# Patient Record
Sex: FEMALE | Race: WHITE
[De-identification: names, ages, dates, MRNs, and addresses within clinical notes are randomized per-mention and may not be internally consistent; named-entity substitution may affect disease eponyms.]

---

## 2020-01-03 ENCOUNTER — HOSPITAL ENCOUNTER (OUTPATIENT)
Dept: HOSPITAL 53 - M WHC | Age: 25
End: 2020-01-03
Attending: ADVANCED PRACTICE MIDWIFE
Payer: COMMERCIAL

## 2020-01-03 DIAGNOSIS — Z34.02: Primary | ICD-10-CM

## 2020-01-28 ENCOUNTER — HOSPITAL ENCOUNTER (OUTPATIENT)
Dept: HOSPITAL 53 - M WHC | Age: 25
End: 2020-01-28
Attending: ADVANCED PRACTICE MIDWIFE
Payer: COMMERCIAL

## 2020-01-28 ENCOUNTER — HOSPITAL ENCOUNTER (OUTPATIENT)
Dept: HOSPITAL 53 - M PLALAB | Age: 25
End: 2020-01-28
Attending: ADVANCED PRACTICE MIDWIFE
Payer: COMMERCIAL

## 2020-01-28 DIAGNOSIS — Z13.79: Primary | ICD-10-CM

## 2020-01-28 DIAGNOSIS — Z34.02: Primary | ICD-10-CM

## 2020-01-29 NOTE — REP
Clinical:  Anatomical evaluation.

 

Comparison: None .

 

Findings:

Examination demonstrates a single live intrauterine pregnancy in cephalic

presentation.  Fetal motion is identified by technologist.  Placenta is noted

posterior and grade I without evidence for placenta previa or abruption.

Amniotic fluid volume is normal.  Cervix measures 4.2 cm in length and appears

closed.  No evidence for nuchal cord.

 

Gestational age by current measurements 18 weeks 6 days with KRISHNA 06/24/2020 .

 

FHR equals 158 beats per minute.

BPD  4.4 cm     19 weeks 2 days

HC  16.1 cm     18 weeks 6 days

AC  12.8 cm     18 weeks 3 days

FL  3.0 cm      19 weeks 2 days

HL  2.8 cm      19 weeks 0 days

HC/AC ratio  1.26

 

Estimated fetal weight 259 grams ( 46 percentile).

 

Anatomical assessment demonstrates normal structures including cranium, choroid

plexus, cavum, cerebellum/posterior fossa, diaphragm, stomach, cord

insertion/three-vessel cord, kidneys/bladder, spine, and extremities.

 

Impression:

Single live intrauterine pregnancy in cephalic presentation demonstrating

appropriate estimated fetal weight.

2.  Limited evaluation of the facial features and heart/ventricular outflow

tract.  Remainder of the anatomical assessment is complete and normal.

## 2020-02-25 ENCOUNTER — HOSPITAL ENCOUNTER (OUTPATIENT)
Dept: HOSPITAL 53 - M WHC | Age: 25
End: 2020-02-25
Attending: OBSTETRICS & GYNECOLOGY
Payer: COMMERCIAL

## 2020-02-25 DIAGNOSIS — Z3A.22: ICD-10-CM

## 2020-02-25 DIAGNOSIS — Z34.82: Primary | ICD-10-CM

## 2020-02-25 NOTE — REP
Obstetric ultrasound for fetal anatomy follow-up:

On the prior study of 01/28 2020 suboptimally demonstrated were the fetal facial

profile, four-chamber view of the heart and cardiac right and left ventricular

outflow tracts.  The remainder of the fetal anatomy was adequately demonstrated

and unremarkable.

 

On the study today the fetal facial profile, four-chamber view of the heart and

cardiac right and left ventricular outflow tracts are adequately demonstrated and

are unremarkable.

 

The remainder of the fetal anatomy was unremarkable previously.

There are no fetal anomalies.

 

There is a single intrauterine gestation in a breech presentation.

There is fetal movement and cardiac activity.

The fetal heart rate is 161 beats per minute.

The placenta is posterior pleura.  There is no previa.  The placenta is grade 1.

The amniotic fluid volume subjectively normal.

The cervix measures 4.3 cm length.

Fetal heart rate is 161 beats per minute.

 

Gestational age by today's ultrasound is 22 weeks 3 days.

Gestational age by the first ultrasound is 22 weeks 6 days.

The KRISHNA based on gestational age of 0.2 weeks 6 days is a 06/24/2020.

 

Fetal weight is 552 grams/1 pound, 3 ounces.

This is the 49th percentile for 22-week 6 days.

 

 

Electronically Signed by

Jhonny Carrillo MD 02/25/2020 11:48 A

## 2020-03-19 ENCOUNTER — HOSPITAL ENCOUNTER (OUTPATIENT)
Dept: HOSPITAL 53 - M PLALAB | Age: 25
End: 2020-03-19
Attending: OBSTETRICS & GYNECOLOGY
Payer: COMMERCIAL

## 2020-03-19 DIAGNOSIS — Z34.02: Primary | ICD-10-CM

## 2020-03-19 LAB
HCT VFR BLD AUTO: 37.5 % (ref 36–47)
HGB BLD-MCNC: 12.4 G/DL (ref 12–15.5)
MCH RBC QN AUTO: 31.2 PG (ref 27–33)
MCHC RBC AUTO-ENTMCNC: 33.1 G/DL (ref 32–36.5)
MCV RBC AUTO: 94.5 FL (ref 80–96)
PLATELET # BLD AUTO: 298 10^3/UL (ref 150–450)
RBC # BLD AUTO: 3.97 10^6/UL (ref 4–5.4)
WBC # BLD AUTO: 13.4 10^3/UL (ref 4–10)

## 2020-05-26 ENCOUNTER — HOSPITAL ENCOUNTER (OUTPATIENT)
Dept: HOSPITAL 53 - M SFHCWAGY | Age: 25
End: 2020-05-26
Attending: OBSTETRICS & GYNECOLOGY
Payer: COMMERCIAL

## 2020-05-26 DIAGNOSIS — Z34.03: Primary | ICD-10-CM

## 2020-05-27 ENCOUNTER — HOSPITAL ENCOUNTER (OUTPATIENT)
Dept: HOSPITAL 53 - M PLALAB | Age: 25
End: 2020-05-27
Attending: OBSTETRICS & GYNECOLOGY
Payer: COMMERCIAL

## 2020-05-27 DIAGNOSIS — Z34.03: Primary | ICD-10-CM

## 2020-05-27 LAB
ALT SERPL W P-5'-P-CCNC: 16 U/L (ref 12–78)
BILIRUB SERPL-MCNC: 0.3 MG/DL (ref 0.2–1)
CREAT SERPL-MCNC: 0.7 MG/DL (ref 0.55–1.3)
CREAT UR-MCNC: 40.9 MG/DL
GFR SERPL CREATININE-BSD FRML MDRD: > 60 ML/MIN/{1.73_M2} (ref 60–?)
HCT VFR BLD AUTO: 37.2 % (ref 36–47)
HGB BLD-MCNC: 12.3 G/DL (ref 12–15.5)
LDH SERPL L TO P-CCNC: 178 U/L (ref 84–246)
MCH RBC QN AUTO: 29.9 PG (ref 27–33)
MCHC RBC AUTO-ENTMCNC: 33.1 G/DL (ref 32–36.5)
MCV RBC AUTO: 90.5 FL (ref 80–96)
PLATELET # BLD AUTO: 272 10^3/UL (ref 150–450)
PROT UR-MCNC: 11.8 MG/DL (ref 0–12)
RBC # BLD AUTO: 4.11 10^6/UL (ref 4–5.4)
URATE SERPL-MCNC: 4.8 MG/DL (ref 2.6–6)
WBC # BLD AUTO: 10 10^3/UL (ref 4–10)

## 2020-06-13 ENCOUNTER — HOSPITAL ENCOUNTER (OUTPATIENT)
Dept: HOSPITAL 53 - M LABSMTC | Age: 25
End: 2020-06-13
Attending: ANESTHESIOLOGY
Payer: COMMERCIAL

## 2020-06-13 DIAGNOSIS — Z11.59: ICD-10-CM

## 2020-06-13 DIAGNOSIS — Z03.818: Primary | ICD-10-CM

## 2020-06-16 ENCOUNTER — HOSPITAL ENCOUNTER (INPATIENT)
Dept: HOSPITAL 53 - M LDI | Age: 25
LOS: 2 days | Discharge: HOME | DRG: 773 | End: 2020-06-18
Attending: OBSTETRICS & GYNECOLOGY | Admitting: OBSTETRICS & GYNECOLOGY
Payer: COMMERCIAL

## 2020-06-16 VITALS — DIASTOLIC BLOOD PRESSURE: 64 MMHG | SYSTOLIC BLOOD PRESSURE: 119 MMHG

## 2020-06-16 VITALS — WEIGHT: 219.8 LBS | HEIGHT: 63 IN | BODY MASS INDEX: 38.95 KG/M2

## 2020-06-16 VITALS — SYSTOLIC BLOOD PRESSURE: 113 MMHG | DIASTOLIC BLOOD PRESSURE: 60 MMHG

## 2020-06-16 VITALS — DIASTOLIC BLOOD PRESSURE: 61 MMHG | SYSTOLIC BLOOD PRESSURE: 106 MMHG

## 2020-06-16 VITALS — SYSTOLIC BLOOD PRESSURE: 124 MMHG | DIASTOLIC BLOOD PRESSURE: 71 MMHG

## 2020-06-16 VITALS — DIASTOLIC BLOOD PRESSURE: 57 MMHG | SYSTOLIC BLOOD PRESSURE: 108 MMHG

## 2020-06-16 VITALS — DIASTOLIC BLOOD PRESSURE: 69 MMHG | SYSTOLIC BLOOD PRESSURE: 121 MMHG

## 2020-06-16 VITALS — SYSTOLIC BLOOD PRESSURE: 110 MMHG | DIASTOLIC BLOOD PRESSURE: 58 MMHG

## 2020-06-16 VITALS — SYSTOLIC BLOOD PRESSURE: 120 MMHG | DIASTOLIC BLOOD PRESSURE: 77 MMHG

## 2020-06-16 VITALS — SYSTOLIC BLOOD PRESSURE: 121 MMHG | DIASTOLIC BLOOD PRESSURE: 80 MMHG

## 2020-06-16 DIAGNOSIS — Z3A.39: ICD-10-CM

## 2020-06-16 DIAGNOSIS — O26.893: Primary | ICD-10-CM

## 2020-06-16 DIAGNOSIS — M51.26: ICD-10-CM

## 2020-06-16 LAB
HCT VFR BLD AUTO: 37.2 % (ref 36–47)
HGB BLD-MCNC: 12.6 G/DL (ref 12–15.5)
MCH RBC QN AUTO: 30.3 PG (ref 27–33)
MCHC RBC AUTO-ENTMCNC: 33.9 G/DL (ref 32–36.5)
MCV RBC AUTO: 89.4 FL (ref 80–96)
PLATELET # BLD AUTO: 280 10^3/UL (ref 150–450)
RBC # BLD AUTO: 4.16 10^6/UL (ref 4–5.4)
WBC # BLD AUTO: 13 10^3/UL (ref 4–10)

## 2020-06-16 RX ADMIN — SODIUM CHLORIDE, POTASSIUM CHLORIDE, SODIUM LACTATE AND CALCIUM CHLORIDE SCH MLS/HR: 600; 310; 30; 20 INJECTION, SOLUTION INTRAVENOUS at 16:10

## 2020-06-16 RX ADMIN — KETOROLAC TROMETHAMINE SCH MG: 30 INJECTION, SOLUTION INTRAMUSCULAR at 14:29

## 2020-06-16 RX ADMIN — Medication SCH TAB: at 12:59

## 2020-06-16 RX ADMIN — DOCUSATE SODIUM SCH MG: 100 CAPSULE, LIQUID FILLED ORAL at 12:59

## 2020-06-16 RX ADMIN — SODIUM CHLORIDE, POTASSIUM CHLORIDE, SODIUM LACTATE AND CALCIUM CHLORIDE SCH MLS/HR: 600; 310; 30; 20 INJECTION, SOLUTION INTRAVENOUS at 12:59

## 2020-06-16 RX ADMIN — DOCUSATE SODIUM SCH MG: 100 CAPSULE, LIQUID FILLED ORAL at 21:18

## 2020-06-16 RX ADMIN — KETOROLAC TROMETHAMINE SCH MG: 30 INJECTION, SOLUTION INTRAMUSCULAR at 21:17

## 2020-06-16 NOTE — ROOPDOC
[facility] Report Of Operation


Report of Operation


DATE OF PROCEDURE: 20





SURGEON: Zuly Ward M.D.





ASSISTANT: Naina Palmer CNM





ANESTHESIA: Spinal





PREOPERATIVE DIAGNOSIS: 


1. History of chronic back pain and herniated disc


2. Intrauterine pregnancy at 39 weeks


3. Elective primary  section





POSTOPERATIVE DIAGNOSIS: 


1. History of chronic back pain and herniated disc


2. Intrauterine pregnancy at 39 weeks


3. Elective primary  section





ESTIMATED BLOOD LOSS: 500 mL





URINE OUTPUT: 275 mL





INTRAVENOUS FLUIDS: 1 L of lactated Ringer solution





PREOPERATIVE ANTIBIOTICS:. 2 g of Ancef





OPERATIVE FINDINGS: Liveborn male infant, Apgars 9 and 9. Weight was 3780 g or 8

lbs. 5 oz.





SPECIMENS:. Cord blood





DESCRIPTION OF PROCEDURE: After informed consent was obtained and written 

consent was reviewed. The patient was brought to the operating room where spinal

anesthesia was placed. She was then placed in the supine position with a left 

lateral tilt. Miguel catheter was placed and to gravity. Patient was then prepped

and draped in the normal sterile fashion. A timeout operating room was performed

identifying the patient, procedure be performed as well as drug allergies. 

Anesthesia was tested and deemed to be adequate. Pfannenstiel skin incision was 

made and this was carried down to the underlying rectus fascia. The fascia was 

then scored and this incision was extended bilaterally. The fascia was then 

dissected off the underlying rectus muscle superiorly and inferiorly. The rectus

muscles were then  in the midline. The peritoneum is then entered. 

Vesicouterine peritoneum was then tented and excised and a bladder flap was 

created. Mobius retractor was then placed. Next, a curvilinear incision was then

made in the lower uterine segment. Amniotomy was performed, productive, clear 

fluid. The fetal head was brought to the level of the incision atraumatically 

and delivered along the shoulders and corpus. The cord was clamped x2. The 

infant was brought over to the warmer with a good cry. Placenta was drained and 

delivered grossly intact. The uterus was cleared of all clots and debris and the

uterine incision was then closed in 2 layers using 0 Vicryl, first in a running 

locking fashion followed by second layer for imbrication. The abdomen suctioned.

 Surgical sites reinspected and noted be hemostatic. The retractor was then 

removed. The anterior peritoneum was then reapproximated with 3-0 Vicryl. The 

rectus muscles were reapproximated 3-0 Vicryl. The fascia was then closed using 

0 Vicryl in a running nonlocking fashion. The subcutaneous tissues was then 

irrigated and suctioned. Subcutaneous tissue was reapproximated using 3-0 

Vicryl. Several subdermal stitch is placed using 3-0 Vicryl and the skin was 

closed with 4-0 Monocryl and subcuticular fashion. This incision was then 

cleaned and dried and was dressed. The patient was then taken to recovery in 

stable condition. All counts were correct. 





My surgical assistant Jasmin Palmer played in an essential role during the 

operation. They assisted with tissue identification retraction, delivery of the 

, as well as wound closure.











ZULY WARD MD.                 2020 09:08

## 2020-06-17 VITALS — SYSTOLIC BLOOD PRESSURE: 118 MMHG | DIASTOLIC BLOOD PRESSURE: 60 MMHG

## 2020-06-17 VITALS — DIASTOLIC BLOOD PRESSURE: 64 MMHG | SYSTOLIC BLOOD PRESSURE: 130 MMHG

## 2020-06-17 VITALS — SYSTOLIC BLOOD PRESSURE: 100 MMHG | DIASTOLIC BLOOD PRESSURE: 58 MMHG

## 2020-06-17 VITALS — SYSTOLIC BLOOD PRESSURE: 113 MMHG | DIASTOLIC BLOOD PRESSURE: 53 MMHG

## 2020-06-17 VITALS — DIASTOLIC BLOOD PRESSURE: 58 MMHG | SYSTOLIC BLOOD PRESSURE: 124 MMHG

## 2020-06-17 LAB
HCT VFR BLD AUTO: 28.5 % (ref 36–47)
HGB BLD-MCNC: 9.7 G/DL (ref 12–15.5)
MCH RBC QN AUTO: 31 PG (ref 27–33)
MCHC RBC AUTO-ENTMCNC: 34 G/DL (ref 32–36.5)
MCV RBC AUTO: 91.1 FL (ref 80–96)
PLATELET # BLD AUTO: 224 10^3/UL (ref 150–450)
RBC # BLD AUTO: 3.13 10^6/UL (ref 4–5.4)
WBC # BLD AUTO: 13.1 10^3/UL (ref 4–10)

## 2020-06-17 RX ADMIN — KETOROLAC TROMETHAMINE SCH MG: 30 INJECTION, SOLUTION INTRAMUSCULAR at 03:10

## 2020-06-17 RX ADMIN — DOCUSATE SODIUM SCH MG: 100 CAPSULE, LIQUID FILLED ORAL at 20:23

## 2020-06-17 RX ADMIN — IBUPROFEN SCH MG: 800 TABLET, FILM COATED ORAL at 12:29

## 2020-06-17 RX ADMIN — IBUPROFEN SCH MG: 800 TABLET, FILM COATED ORAL at 18:34

## 2020-06-17 RX ADMIN — Medication SCH TAB: at 09:12

## 2020-06-17 RX ADMIN — DOCUSATE SODIUM SCH MG: 100 CAPSULE, LIQUID FILLED ORAL at 09:12

## 2020-06-17 NOTE — IPNPDOC
Postpartum Progress Note


Date of Service:  2020


Postpartum Day#:  1


Postpartum Progress Note


SUBJECT: Doing well without complaints. Ambulating, voiding and pain is well-

controlled. Reports minimal lochia. +breast feeding





OBJECTIVE: 


VITAL SIGNS: Within normal limits, afebrile.


Alert and oriented times three.


Abdomen: Fundus firm at U-2. Soft, NTTP.


Incision: Clean, dry, intact, not erythematous well approximated


Ext: neg calf tenderness.


ASSESSMENT: Postpartum /postoperative day#1 status post primary  

section. Recovering in stable condition.





PLAN:


1. Continue routine postpartum/postoperative care


2. Discharge plans for tomorrow





VS, I&O, 24H, Fishbone


Vital Signs/I&O





Vital Signs








  Date Time  Temp Pulse Resp B/P (MAP) Pulse Ox O2 Delivery O2 Flow Rate FiO2


 


20 06:15   18     


 


20 05:48 97.8 88  113/53 (73) 97 Room Air  











l


I&O- Last 24 Hours up to 6 AM 


 


 20





 06:00


 


Intake Total 2710 ml


 


Output Total 1675 ml


 


Balance 1035 ml











Laboratory Data


24H LABS


Laboratory Tests 2


20 06:40: Nucleated Red Blood Cells % (auto) 0.0


CBC/BMP


Laboratory Tests


20 06:40

















MIRTA ALTMAN MD.                 2020 07:36

## 2020-06-18 VITALS — SYSTOLIC BLOOD PRESSURE: 123 MMHG | DIASTOLIC BLOOD PRESSURE: 68 MMHG

## 2020-06-18 VITALS — DIASTOLIC BLOOD PRESSURE: 71 MMHG | SYSTOLIC BLOOD PRESSURE: 116 MMHG

## 2020-06-18 VITALS — DIASTOLIC BLOOD PRESSURE: 66 MMHG | SYSTOLIC BLOOD PRESSURE: 122 MMHG

## 2020-06-18 LAB
APPEARANCE UR: (no result)
BACTERIA UR QL AUTO: NEGATIVE
BILIRUB UR QL STRIP.AUTO: NEGATIVE
GLUCOSE UR QL STRIP.AUTO: NEGATIVE MG/DL
HGB UR QL STRIP.AUTO: (no result)
KETONES UR QL STRIP.AUTO: NEGATIVE MG/DL
LEUKOCYTE ESTERASE UR QL STRIP.AUTO: NEGATIVE
NITRITE UR QL STRIP.AUTO: NEGATIVE
PH UR STRIP.AUTO: 7 UNITS (ref 5–9)
PROT UR QL STRIP.AUTO: NEGATIVE MG/DL
RBC # UR AUTO: 140 /HPF (ref 0–3)
SP GR UR STRIP.AUTO: 1 (ref 1–1.03)
SQUAMOUS #/AREA URNS AUTO: 3 /HPF (ref 0–6)
UROBILINOGEN UR QL STRIP.AUTO: 0.2 MG/DL (ref 0–2)
WBC #/AREA URNS AUTO: 7 /HPF (ref 0–3)

## 2020-06-18 RX ADMIN — Medication SCH TAB: at 07:56

## 2020-06-18 RX ADMIN — IBUPROFEN SCH MG: 800 TABLET, FILM COATED ORAL at 03:12

## 2020-06-18 RX ADMIN — DOCUSATE SODIUM SCH MG: 100 CAPSULE, LIQUID FILLED ORAL at 07:56

## 2020-06-18 RX ADMIN — IBUPROFEN SCH MG: 800 TABLET, FILM COATED ORAL at 11:46

## 2022-11-14 ENCOUNTER — HOSPITAL ENCOUNTER (OUTPATIENT)
Dept: HOSPITAL 53 - M LABSMTC | Age: 27
End: 2022-11-14
Payer: COMMERCIAL

## 2022-11-14 DIAGNOSIS — Z11.52: ICD-10-CM

## 2022-11-14 DIAGNOSIS — Z01.812: Primary | ICD-10-CM
